# Patient Record
Sex: MALE | Race: WHITE | NOT HISPANIC OR LATINO | Employment: FULL TIME | URBAN - METROPOLITAN AREA
[De-identification: names, ages, dates, MRNs, and addresses within clinical notes are randomized per-mention and may not be internally consistent; named-entity substitution may affect disease eponyms.]

---

## 2022-01-29 PROBLEM — M76.32 ILIOTIBIAL BAND SYNDROME OF LEFT SIDE: Status: ACTIVE | Noted: 2022-01-29

## 2023-04-17 ENCOUNTER — OFFICE VISIT (OUTPATIENT)
Dept: URGENT CARE | Facility: CLINIC | Age: 63
End: 2023-04-17
Payer: COMMERCIAL

## 2023-04-17 VITALS
HEART RATE: 70 BPM | WEIGHT: 234 LBS | HEIGHT: 69 IN | SYSTOLIC BLOOD PRESSURE: 152 MMHG | DIASTOLIC BLOOD PRESSURE: 102 MMHG | RESPIRATION RATE: 16 BRPM | OXYGEN SATURATION: 95 % | TEMPERATURE: 100.2 F | BODY MASS INDEX: 34.66 KG/M2

## 2023-04-17 DIAGNOSIS — B34.9 VIRAL ILLNESS: Primary | ICD-10-CM

## 2023-04-17 DIAGNOSIS — R50.9 FEVER, UNSPECIFIED FEVER CAUSE: ICD-10-CM

## 2023-04-17 DIAGNOSIS — J02.9 SORE THROAT: ICD-10-CM

## 2023-04-17 DIAGNOSIS — R03.0 ELEVATED BLOOD PRESSURE READING: ICD-10-CM

## 2023-04-17 LAB — S PYO DNA SPEC NAA+PROBE: NOT DETECTED

## 2023-04-17 PROCEDURE — 87651 STREP A DNA AMP PROBE: CPT | Performed by: NURSE PRACTITIONER

## 2023-04-17 PROCEDURE — 99203 OFFICE O/P NEW LOW 30 MIN: CPT | Performed by: NURSE PRACTITIONER

## 2023-04-17 NOTE — PROGRESS NOTES
"Dany Rodriguez is a 62 y.o. male who presents for Pharyngitis (X3 days, nasal congestion)      HPI  This is a new problem. Dany Rodriguez is a 62 y.o. patient who presents to urgent care with c/o: severe throat x3 days. It hurts to swallow but swallow has not been impaired. Reports having chills on Friday evening but not fever. He has some congestion he is attributing to seasonal allergies. Nasal drainage is clear. He has used throat lozenges, theraflu, and 1 dose of ibuprofen which have helped. He uses Sinutab for his congestion.   Denies Cough, sob, fevers, sick contacts.   No other aggravating or alleviating factors.       ROS See HPI    Allergies:       Allergies   Allergen Reactions    Other Environmental        PMSFS Hx:  Past Medical History:   Diagnosis Date    Allergy, unspecified not elsewhere classified      Past Surgical History:   Procedure Laterality Date    APPENDECTOMY  1988    TONSILLECTOMY  1962     History reviewed. No pertinent family history.  Social History     Tobacco Use    Smoking status: Never    Smokeless tobacco: Never   Substance Use Topics    Alcohol use: Yes       Problems:   Patient Active Problem List   Diagnosis    Iliotibial band syndrome of left side       Medications:   Current Outpatient Medications on File Prior to Visit   Medication Sig Dispense Refill    meloxicam (MOBIC) 15 MG tablet Take 1 Tablet by mouth every day. 30 Tablet 0     No current facility-administered medications on file prior to visit.          Objective:     BP (!) 152/102 Comment (BP Location): Left arm  Pulse 70   Temp 37.9 °C (100.2 °F) (Temporal)   Resp 16   Ht 1.753 m (5' 9\")   Wt 106 kg (234 lb)   SpO2 95%   BMI 34.56 kg/m²     Physical Exam  Constitutional:       General: He is not in acute distress.     Appearance: Normal appearance.   HENT:      Right Ear: Tympanic membrane, ear canal and external ear normal.      Left Ear: Tympanic membrane, ear canal and external ear normal.      Nose: " Mucosal edema and congestion (clear drainage) present.      Right Turbinates: Not swollen.      Left Turbinates: Not swollen.      Right Sinus: No maxillary sinus tenderness or frontal sinus tenderness.      Left Sinus: No maxillary sinus tenderness or frontal sinus tenderness.      Mouth/Throat:      Mouth: Mucous membranes are moist.      Pharynx: Oropharyngeal exudate (post nasal drainage) present.   Eyes:      General:         Right eye: No discharge.         Left eye: No discharge.   Cardiovascular:      Rate and Rhythm: Normal rate and regular rhythm.   Pulmonary:      Effort: Pulmonary effort is normal.      Breath sounds: Normal breath sounds.   Lymphadenopathy:      Cervical: No cervical adenopathy.   Skin:     General: Skin is warm and dry.      Capillary Refill: Capillary refill takes less than 2 seconds.   Neurological:      Mental Status: He is alert.   Psychiatric:         Mood and Affect: Mood normal.         Behavior: Behavior normal.         Thought Content: Thought content normal.           Results for orders placed or performed in visit on 04/17/23   POCT GROUP A STREP, PCR   Result Value Ref Range    POC Group A Strep, PCR Not Detected Not Detected, Invalid         Assessment /Associated Orders:      1. Viral illness        2. Sore throat  POCT GROUP A STREP, PCR    Referral to establish with [facility]CP      3. Fever, unspecified fever cause  Referral to establish with [facility] PCP      4. Elevated blood pressure reading  Referral to establish with [facility] PCP            Medical Decision Making:    Pt is clinically stable at today's acute urgent care visit.  No acute distress noted. Appropriate for outpatient care at this time.   Acute problem today with uncertain prognosis.   Declines COVID PCR test today. Advised that symptoms could be consistent with covid viral infection. Verbalized understanding.   Referral to PCP to establish care FU regarding elevated BP. Encouraged home BP monitoring and  keeping record for PCP.    Humidifier at night prn   Discussed that this illness appears to be viral in nature. I did not see any evidence of a bacterial process.   OTC medications can be used for symptom relief. Follow manufacturers guidelines for dosing and instructions.  These OTC medications will not make you better any faster but can help reduce your discomfort.     Discussed Dx, management options (risks,benefits, and alternatives to planned treatment), natural progression and supportive care.  Expressed understanding and the treatment plan was agreed upon.   Questions were encouraged and answered   Return to PCP/ urgent care prn if new or worsening sx or if there is no improvement in condition prn.      I have personally evaluated, supervised and participated in the complete care of this patient. I am in agreement with student assessment and plan as documented.       Time I spent evaluating Dany Rodriguez in urgent care today was 30  minutes. This time includes preparing for visit, reviewing any pertinent notes or test results, counseling/education, exam, obtaining HPI, interpretation of lab tests, medication management and documentation as indicated above.Time does not include separately billable procedures noted .       Please note that this dictation was created using voice recognition software. I have worked with consultants from the vendor as well as technical experts from [facility] to optimize the interface. I have made every reasonable attempt to correct obvious errors, but I expect that there are errors of grammar and possibly content that I did not discover before finalizing the note.  This note was electronically signed by provider

## 2025-03-22 ENCOUNTER — OFFICE VISIT (OUTPATIENT)
Dept: URGENT CARE | Facility: CLINIC | Age: 65
End: 2025-03-22
Payer: COMMERCIAL

## 2025-03-22 VITALS
RESPIRATION RATE: 14 BRPM | OXYGEN SATURATION: 97 % | DIASTOLIC BLOOD PRESSURE: 82 MMHG | WEIGHT: 188 LBS | TEMPERATURE: 98.3 F | HEART RATE: 58 BPM | BODY MASS INDEX: 27.85 KG/M2 | HEIGHT: 69 IN | SYSTOLIC BLOOD PRESSURE: 142 MMHG

## 2025-03-22 DIAGNOSIS — R10.32 INGUINAL PAIN, LEFT: ICD-10-CM

## 2025-03-22 PROCEDURE — 3077F SYST BP >= 140 MM HG: CPT | Performed by: PHYSICIAN ASSISTANT

## 2025-03-22 PROCEDURE — 99213 OFFICE O/P EST LOW 20 MIN: CPT | Performed by: PHYSICIAN ASSISTANT

## 2025-03-22 PROCEDURE — 3079F DIAST BP 80-89 MM HG: CPT | Performed by: PHYSICIAN ASSISTANT

## 2025-03-22 NOTE — PROGRESS NOTES
"Subjective     Dany Rodriguez is a 64 y.o. male who presents with Hernia (Concerns )            Patient is here with concerns for possible hernia. Patient reports noticing an intermittent bulge in his left inguinal area for about 1 month. He states he started noticing it after doing planks. He has minimal pain when this happens- he reports 1/10. He states the bulge occurs about 2 times daily. He has been able to reduce it at one time. It does not  radiate into testicle. He is requesting imaging to rule out inguinal hernia. Patient denies active symptoms at this time.          Past Medical History:   Diagnosis Date    Allergy, unspecified not elsewhere classified        Past Surgical History:   Procedure Laterality Date    APPENDECTOMY  1988    TONSILLECTOMY  1962       No family history on file.    Allergies:   Other environmental    Medications, Allergies, and current problem list reviewed today in Epic    Review of Systems   Constitutional:  Negative for chills, fever and malaise/fatigue.   Gastrointestinal:  Negative for abdominal pain, nausea and vomiting.        Bulge in left inguinal area- intermittent   Genitourinary:  Negative for dysuria, frequency, hematuria and urgency.   Neurological:  Negative for headaches.              Objective     BP (!) 142/82 (BP Location: Right arm, Patient Position: Sitting, BP Cuff Size: Adult)   Pulse (!) 58   Temp 36.8 °C (98.3 °F)   Resp 14   Ht 1.753 m (5' 9\")   Wt 85.3 kg (188 lb)   SpO2 97%   BMI 27.76 kg/m²      Physical Exam  Constitutional:       General: He is not in acute distress.     Appearance: He is not ill-appearing.   HENT:      Head: Normocephalic and atraumatic.   Eyes:      Conjunctiva/sclera: Conjunctivae normal.   Cardiovascular:      Rate and Rhythm: Regular rhythm. Bradycardia present.   Pulmonary:      Effort: Pulmonary effort is normal. No respiratory distress.      Breath sounds: No stridor. No wheezing.   Skin:     General: Skin is warm and " dry.   Neurological:      General: No focal deficit present.      Mental Status: He is alert and oriented to person, place, and time.   Psychiatric:         Mood and Affect: Mood normal.         Behavior: Behavior normal.         Thought Content: Thought content normal.         Judgment: Judgment normal.                                  Assessment & Plan  Inguinal pain, left  Intermittent- none currently    Orders:    US-INGUINAL HERNIA; Future           Differential diagnoses, Supportive care, and indications for immediate follow-up discussed with patient.   Pathogenesis of diagnosis discussed including typical length and natural progression.   Instructed to return to clinic or nearest emergency department for any change in condition, further concerns, or worsening of symptoms.      The patient demonstrated a good understanding and agreed with the treatment plan.    Sofya Cervantes P.A.-C.

## 2025-03-23 ASSESSMENT — ENCOUNTER SYMPTOMS
FEVER: 0
ABDOMINAL PAIN: 0
HEADACHES: 0
VOMITING: 0
NAUSEA: 0
CHILLS: 0

## 2025-03-24 ENCOUNTER — APPOINTMENT (OUTPATIENT)
Dept: RADIOLOGY | Facility: MEDICAL CENTER | Age: 65
End: 2025-03-24
Attending: PHYSICIAN ASSISTANT
Payer: COMMERCIAL

## 2025-03-24 DIAGNOSIS — R10.32 INGUINAL PAIN, LEFT: ICD-10-CM

## 2025-03-24 PROCEDURE — 76857 US EXAM PELVIC LIMITED: CPT

## 2025-03-28 DIAGNOSIS — K40.90 NON-RECURRENT UNILATERAL INGUINAL HERNIA WITHOUT OBSTRUCTION OR GANGRENE: ICD-10-CM

## 2025-05-21 ENCOUNTER — OFFICE VISIT (OUTPATIENT)
Facility: MEDICAL CENTER | Age: 65
End: 2025-05-21
Payer: COMMERCIAL

## 2025-05-21 VITALS
HEART RATE: 55 BPM | SYSTOLIC BLOOD PRESSURE: 146 MMHG | BODY MASS INDEX: 27.95 KG/M2 | TEMPERATURE: 98.1 F | OXYGEN SATURATION: 99 % | DIASTOLIC BLOOD PRESSURE: 88 MMHG | HEIGHT: 69 IN | WEIGHT: 188.71 LBS

## 2025-05-21 DIAGNOSIS — K40.20 NON-RECURRENT BILATERAL INGUINAL HERNIA WITHOUT OBSTRUCTION OR GANGRENE: Primary | ICD-10-CM

## 2025-05-21 DIAGNOSIS — K42.9 UMBILICAL HERNIA WITHOUT OBSTRUCTION AND WITHOUT GANGRENE: ICD-10-CM

## 2025-05-21 PROCEDURE — 3079F DIAST BP 80-89 MM HG: CPT | Performed by: SURGERY

## 2025-05-21 PROCEDURE — 3077F SYST BP >= 140 MM HG: CPT | Performed by: SURGERY

## 2025-05-21 PROCEDURE — 99203 OFFICE O/P NEW LOW 30 MIN: CPT | Performed by: SURGERY

## 2025-05-21 NOTE — PROGRESS NOTES
DEPARTMENT OF SURGERY  BARIATRIC AND GASTROESOPHAGEAL SURGERY       DATE OF CLINIC VISIT:  5/21/2025      CHIEF COMPLAINT:  Symptomatic left inguinal bulge, associated with pain/discomfort    HISTORY OF PRESENT ILLNESS:  We had the pleasure of seeing Dany Rodriguez, a 64 y.o. male, for evaluation of a left inguinal bulge, associated with occasional pain/discomfort when exercising. This consultation was requested by Dr. Cervantes. Patient reports that his symptoms are more prominent when lifting, coughing, performing strenuous activities, and getting up from standing. The bulging in the inguinal area is reducible and is limiting exercise plus some activities of daily living. No history of intestinal obstruction (bloating/distention, nausea/vomiting, worsening abdominal pain, or obstipation) or any episodes of this bulge being irreducible. Initial onset of symptoms started about 6 months ago. No symptoms on the contralateral side and no other personal history of hernias. Pertinent imaging includes: Groin U/S (Date: 3/24/2025).    Allergies[1]    Past Medical History[2]     Past Surgical History[3]    No family history on file.    Social History     Socioeconomic History    Marital status:      Spouse name: Not on file    Number of children: Not on file    Years of education: Not on file    Highest education level: Not on file   Occupational History    Not on file   Tobacco Use    Smoking status: Never    Smokeless tobacco: Never   Substance and Sexual Activity    Alcohol use: Yes    Drug use: No    Sexual activity: Not on file   Other Topics Concern    Not on file   Social History Narrative    Not on file     Social Drivers of Health     Financial Resource Strain: Not on file   Food Insecurity: Not on file   Transportation Needs: Not on file   Physical Activity: Not on file   Stress: Not on file   Social Connections: Not on file   Intimate Partner Violence: Not on file   Housing Stability: Not on file  "      Current Medications[4]     REVIEW OF SYSTEMS:   Review of systems (+10 systems) unremarkable except for concerns noted by patient or items listed. Please see HPI for additional ROS.    PHYSICAL EXAM:  BP (!) 146/88 (BP Location: Right arm, Patient Position: Sitting, BP Cuff Size: Adult)   Pulse (!) 55   Temp 36.7 °C (98.1 °F) (Temporal)   Ht 1.753 m (5' 9\")   Wt 85.6 kg (188 lb 11.4 oz)   SpO2 99%   BMI 27.87 kg/m²     Wt Readings from Last 1 Encounters:   05/21/25 85.6 kg (188 lb 11.4 oz)     Constitutional:  well developed, well nourished, in no apparent distress, alert and oriented x 3  Eyes:   sclera is anicteric, PERRLA  Head:   normocephalic, atraumatic, no scalp lesions  Mouth/Throat:  normal, moist mucous membranes  Neck:   supple, no JVD, no tracheal deviation  Chest:   good respiratory effort, no accessory muscle use  Cardiac:  normal rate, regular rhythm   Abdomen:  soft, no tenderness to palpation, no distention, + small, reducible, umbilical bulge (~ 1 cm fascial defect), o/w, no masses or organomegaly   Groin:     R - no bulges/masses, + palpable cough impulse, no overlying skin changes, no TTP     L - small, reducible, inguinal bulge, no overlying skin changes, no TTP  Genitalia:    normal male genitalia, no scrotal masses or TTP, no overlying skin changes  Extremities:  normal strength, tone, and muscle mass; no clubbing, cyanosis, or edema  bilaterally  Neurologic:  normal without focal findings, cognition is intact, CN II - XII are grossly intact  Skin:   no jaundice, no rashes or significant lesions, no bruising  Psych:   good judgement, mood and affect are appropriate, excellent hygiene    DATA REVIEW:    Groin U/S  (Date: 3/25/2025):    FINDINGS:     There is an indirect reducible fat-containing left inguinal hernia.     Tiny small right inguinal hernia as well.     There is no mass or fluid collection.     IMPRESSION:     There is an indirect reducible fat-containing left inguinal " hernia.    ASSESSMENT AND PLAN:  Mr. Dany Rodriguez is a 64 y.o. male, who complains of a left inguinal bulge, associated with occasional pain/discomfort when exercising, and is found to have a small-size, reducible, left inguinal hernia on clinical exam and diagnostic imaging. He's also found to have a possible small, asymptomatic, right inguinal hernia (both on exam and U/S), as well as, a small umbilical hernia on exam. I have discussed robotic, laparoscopic, and open inguinal hernia repair procedures with mesh reinforcement.     The details of these procedures, alternatives, risks, and disability were discussed with the patient. Specifically, we discussed the risks of bleeding, infection, hydrocele occurrence or recurrence, pain, swelling, injury to regional nerves and/or vas deferens, and possible recurrence of this hernia. We also discussed possible mesh removal, need for re-operation, testicular or labial pain syndrome, hydrocele or cyst removal, loss or removal of testicle (in males), and other serious long and short term complications. In addition, we went over postoperative recovery and limitation of activities. All questions were answered in full. Instructional handout was provided to the patient and reviewed with him. Informed consent was obtained in clinic.    The robotic left, possible right, inguinal hernia repair, with mesh placement, will be scheduled at his earliest convenience.    Thank you for giving us the opportunity to care for your patient.      Sincerely,    Yong Whiteside MD MPH FACS [facility] [place]  Bariatric and Gastroesophageal Surgery  Department of Surgery, [facility]  Clinical Assistant Professor of Surgery  [facility] [employer] [facility]    5/21/2025         [1]   Allergies  Allergen Reactions    Other Environmental    [2]   Past Medical History:  Diagnosis Date    Allergy, unspecified not elsewhere classified    [3]   Past Surgical History:  Procedure Laterality  Date    APPENDECTOMY  1988    TONSILLECTOMY  1962   [4]   No current outpatient medications on file.     No current facility-administered medications for this visit.